# Patient Record
(demographics unavailable — no encounter records)

---

## 2025-04-30 NOTE — HISTORY OF PRESENT ILLNESS
[de-identified] : Mr. Marley comes to office as a referral from ER for left sided inguinal hernia containing fat. Patient was initially seen at an urgent care clinic for right gluteal pain, likely an infected cyst, he received antibiotics and later developed a fever for which he went ER where he received a CT scan showing cellulitis, and an incidental left inguinal hernia. Patient does not report any symptoms of hernia, no nausea or vomiting, no pain upon exertion and no burning sensation.   4/24/25 Patient returns 6 months since last visit, he remains fully asymptomatic from his inguinal hernias, he has not developed any episodes of incarceration or obstruction, is able to continue lifting weights and exercising without issue

## 2025-04-30 NOTE — PHYSICAL EXAM
[Normal Breath Sounds] : Normal breath sounds [Normal Heart Sounds] : normal heart sounds [Abdominal Masses] : No abdominal masses [Abdomen Tenderness] : ~T ~M No abdominal tenderness [No Rash or Lesion] : No rash or lesion [Alert] : alert [Oriented to Person] : oriented to person [Oriented to Place] : oriented to place [Oriented to Time] : oriented to time [Calm] : calm [de-identified] : in no acute distress [de-identified] : NCAT [de-identified] : supple [de-identified] : soft, no umbilical hernia noted, non-tender, no incisions or scars, no right inguinal hernia noted, small left inguinal hernia noted, minimal tenderness  [de-identified] : bilateral descended testes, no masses identified, [de-identified] : 5/5 motor and sensory b/l

## 2025-04-30 NOTE — ASSESSMENT
[FreeTextEntry1] : 49M with asymptomatic left sided inguinal hernia  I counseled patient extensively on both surgical and non-surgical options, I explained the risks and benefits of surgery both open and minimally invasive techniques. I explained that recent literature suggests a watch and wait approach in the asymptomatic inguinal hernia was not inferior to upfront surgery. Patient expressed his preference to continue with non-operative therapy at this time. Patient has now trialed this for 6 months without issues and wishes to remain in the non-operative management pathway at this time. I explain should he experience pain or nausea or vomiting due to his hernia, he should seek urgent evaluation in ER.  Patient will return to clinic PRN

## 2025-04-30 NOTE — CONSULT LETTER
[Dear  ___] : Dear  [unfilled], [Courtesy Letter:] : I had the pleasure of seeing your patient, [unfilled], in my office today. [Please see my note below.] : Please see my note below. [Consult Closing:] : Thank you very much for allowing me to participate in the care of this patient.  If you have any questions, please do not hesitate to contact me. [Sincerely,] : Sincerely, [FreeTextEntry3] : Devyn Jang MD General & MIS/Robotic Surgery [facility] at [address] [place] [address] Tel [phone]     Fax [phone] Cell [phone]

## 2025-04-30 NOTE — DATA REVIEWED
[FreeTextEntry1] : ACC: [id number] EXAM: CT ABDOMEN AND PELVIS IC ORDERED BY: NOA PATELRI  PROCEDURE DATE: 10/05/2024    INTERPRETATION: CLINICAL INDICATION: L buttock abscess vs cellulitis, assess depth  COMPARISON: There are no prior studies available for comparison.  TECHNIQUE: CT imaging of the abdomen and pelvis was performed with IV contrast.  CONTRAST/COMPLICATIONS: IV Contrast: 90 cc Omnipaque 350. Oral Contrast: Complications:  FINDINGS:  LOWER CHEST: within normal limits.  ABDOMEN: LIVER: within normal limits. BILE DUCTS: normal caliber. GALLBLADDER: No calcified gallstones. Normal caliber wall. PANCREAS: within normal limits. SPLEEN: within normal limits. ADRENALS: within normal limits. KIDNEYS/URETERS: within normal limits.  PELVIS: REPRODUCTIVE ORGANS: no pelvic masses. BLADDER: within normal limits.   BOWEL: Portions of the gastrointestinal tract are collapsed, limiting evaluation. No small bowel obstruction or active bowel inflammation. The appendix is normal. PERITONEUM: no ascites or free air, no fluid collection. VESSELS: Normal caliber aorta. RETROPERITONEUM: within normal limits. ABDOMINAL WALL: Extensive infiltration of the subcutaneous fat in the left buttock with overlying skin thickening, consistent with cellulitis. No discrete abscess is visualized. Fat-containing left inguinal hernia. BONES: within normal limits.  IMPRESSION:  EXTENSIVE INFILTRATION OF THE SUBCUTANEOUS FAT IN THE LEFT BUTTOCK WITH OVERLYING SKIN THICKENING, CONSISTENT WITH CELLULITIS. NO DISCRETE ABSCESS IS VISUALIZED.  --- End of Report ---